# Patient Record
Sex: MALE | Race: WHITE | NOT HISPANIC OR LATINO
[De-identification: names, ages, dates, MRNs, and addresses within clinical notes are randomized per-mention and may not be internally consistent; named-entity substitution may affect disease eponyms.]

---

## 2022-06-01 PROBLEM — Z00.00 ENCOUNTER FOR PREVENTIVE HEALTH EXAMINATION: Status: ACTIVE | Noted: 2022-06-01

## 2022-06-02 ENCOUNTER — APPOINTMENT (OUTPATIENT)
Dept: PULMONOLOGY | Facility: CLINIC | Age: 67
End: 2022-06-02
Payer: MEDICARE

## 2022-06-02 VITALS
WEIGHT: 112 LBS | TEMPERATURE: 97.2 F | DIASTOLIC BLOOD PRESSURE: 60 MMHG | HEART RATE: 83 BPM | OXYGEN SATURATION: 97 % | SYSTOLIC BLOOD PRESSURE: 110 MMHG

## 2022-06-02 DIAGNOSIS — Z86.79 PERSONAL HISTORY OF OTHER DISEASES OF THE CIRCULATORY SYSTEM: ICD-10-CM

## 2022-06-02 DIAGNOSIS — Z86.39 PERSONAL HISTORY OF OTHER ENDOCRINE, NUTRITIONAL AND METABOLIC DISEASE: ICD-10-CM

## 2022-06-02 DIAGNOSIS — F17.201 NICOTINE DEPENDENCE, UNSPECIFIED, IN REMISSION: ICD-10-CM

## 2022-06-02 DIAGNOSIS — Z87.891 PERSONAL HISTORY OF NICOTINE DEPENDENCE: ICD-10-CM

## 2022-06-02 PROCEDURE — 99203 OFFICE O/P NEW LOW 30 MIN: CPT

## 2022-06-02 RX ORDER — PREDNISONE 10 MG/1
10 TABLET ORAL
Refills: 0 | Status: COMPLETED | COMMUNITY
Start: 2022-05-03 | End: 2022-06-02

## 2022-06-02 NOTE — DISCUSSION/SUMMARY
[FreeTextEntry1] : 67M smoker here to establish care for COPD management\par \par #COPD\par - No on home O2 after recent hospitalizatioin for pneumonia. Can likely slowly wean off\par - Will continue current regimen as it was maintaining him well prior to the pneumonia episode\par - Will need to obtain records from Dr. Martínez's office to look for prior PFT data\par \par #Tobacco use\par - Never had LDCT for lung ca screening. I have ordered it today and counseled Pt on importance of annual testing\par - Pt would benefit from pulmonary rehab but Pt declines as he cannot physically arrange transport to [facility] for the rehab program

## 2022-06-02 NOTE — REVIEW OF SYSTEMS
[Fatigue] : fatigue [Cough] : cough [Sputum] : sputum [Dyspnea] : dyspnea [SOB on Exertion] : sob on exertion [Claudication] : claudication [Fever] : no fever [Chills] : no chills [Poor Appetite] : no poor appetite [Nasal Congestion] : no nasal congestion [Sinus Problems] : no sinus problems [Hemoptysis] : no hemoptysis [Pleuritic Pain] : no pleuritic pain [Chest Discomfort] : no chest discomfort [Orthopnea] : no orthopnea [Syncope] : no syncope [GERD] : no gerd [Diarrhea] : no diarrhea [Food Intolerance] : no food intolerance [Nocturia] : no nocturia [Frequency] : no dysuria [Arthralgias] : no arthralgias [Myalgias] : no myalgias [Headache] : no headache [Dizziness] : no dizziness [Confusion] : no confusion [Depression] : no depression

## 2022-06-02 NOTE — PHYSICAL EXAM
[No Acute Distress] : no acute distress [No Deformities] : no deformities [Normal Appearance] : normal appearance [No Neck Mass] : no neck mass [Normal Rate/Rhythm] : normal rate/rhythm [Normal S1, S2] : normal s1, s2 [No Murmurs] : no murmurs [No Resp Distress] : no resp distress [No Abnormalities] : no abnormalities [Normal Gait] : normal gait [No Clubbing] : no clubbing [No Edema] : no edema [Normal Color/ Pigmentation] : normal color/ pigmentation [No Focal Deficits] : no focal deficits [Oriented x3] : oriented x3 [Normal Affect] : normal affect [TextBox_2] : cachetic [TextBox_44] : R & L scars from prior carotid surgeries [TextBox_68] : R side with mild egophony

## 2022-06-02 NOTE — HISTORY OF PRESENT ILLNESS
[Former] : former [>= 20 pack years] : >= 20 pack years [Never] : never [TextBox_4] : 67M with CAD, HTN, hyperlipidemia, PAD, COPD who presents to [facility] care. He was previously following with Dr. Martínez.\par \par Pt had pneumonia 1 month ago and was hospitalized at [facility] for 1 week. He was discharged on oxygen to be used during exertion. Pt frequently gets pneumonia in setting of his COPD but reports he had been stable for a long time on current regimen of daliresp, symbicort, and incruse. He does not use a rescue inhaler as he does not feel the need for it. In terms of his respiratory status, he does note daily productive cough since diagnosis of COPD. He has never been on home O2. He was a heavy smoker and amoked about 2.5ppd x50 yrs, quit 3 months ago. Denies weight changes, hemoptysis, pleurisy, syncope. He does feel fatigued ever since discharge home from the hospital.\par \par PMH: CAD s/p PCI; CEA, HTN, TIA, PAD\par PSH: CEA, hip replacements x3\par Meds: xarelto, plavix, atorvastatin, amlodipine, pantoprazole, coreg, daliresp, symbicort, incruse\par \par  [TextBox_11] : 2.5 [TextBox_13] : 50 [YearQuit] : 2022

## 2022-06-09 ENCOUNTER — APPOINTMENT (OUTPATIENT)
Dept: THORACIC SURGERY | Facility: CLINIC | Age: 67
End: 2022-06-09

## 2022-07-27 ENCOUNTER — APPOINTMENT (OUTPATIENT)
Dept: PULMONOLOGY | Facility: CLINIC | Age: 67
End: 2022-07-27

## 2023-06-15 RX ORDER — UMECLIDINIUM 62.5 UG/1
62.5 AEROSOL, POWDER ORAL DAILY
Qty: 1 | Refills: 5 | Status: ACTIVE | COMMUNITY
Start: 2022-06-02 | End: 1900-01-01

## 2023-08-14 DIAGNOSIS — J44.1 CHRONIC OBSTRUCTIVE PULMONARY DISEASE WITH (ACUTE) EXACERBATION: ICD-10-CM

## 2023-08-24 ENCOUNTER — APPOINTMENT (OUTPATIENT)
Dept: PULMONOLOGY | Facility: CLINIC | Age: 68
End: 2023-08-24

## 2023-09-20 ENCOUNTER — APPOINTMENT (OUTPATIENT)
Dept: PULMONOLOGY | Facility: CLINIC | Age: 68
End: 2023-09-20
Payer: MEDICARE

## 2023-09-20 VITALS
SYSTOLIC BLOOD PRESSURE: 132 MMHG | OXYGEN SATURATION: 97 % | HEART RATE: 63 BPM | BODY MASS INDEX: 18.68 KG/M2 | HEIGHT: 67 IN | WEIGHT: 119 LBS | DIASTOLIC BLOOD PRESSURE: 84 MMHG

## 2023-09-20 DIAGNOSIS — J44.9 CHRONIC OBSTRUCTIVE PULMONARY DISEASE, UNSPECIFIED: ICD-10-CM

## 2023-09-20 DIAGNOSIS — J18.9 PNEUMONIA, UNSPECIFIED ORGANISM: ICD-10-CM

## 2023-09-20 DIAGNOSIS — J90 PLEURAL EFFUSION, NOT ELSEWHERE CLASSIFIED: ICD-10-CM

## 2023-09-20 DIAGNOSIS — R06.02 SHORTNESS OF BREATH: ICD-10-CM

## 2023-09-20 PROCEDURE — 99213 OFFICE O/P EST LOW 20 MIN: CPT

## 2023-09-20 RX ORDER — VALSARTAN 80 MG/1
80 TABLET, COATED ORAL
Refills: 0 | Status: ACTIVE | COMMUNITY

## 2023-09-20 RX ORDER — CLOPIDOGREL 75 MG/1
75 TABLET, FILM COATED ORAL
Refills: 0 | Status: ACTIVE | COMMUNITY

## 2023-09-20 RX ORDER — RIVAROXABAN 15 MG/1
15 TABLET, FILM COATED ORAL
Refills: 0 | Status: ACTIVE | COMMUNITY

## 2023-09-20 RX ORDER — AMIODARONE HYDROCHLORIDE 200 MG/1
200 TABLET ORAL
Refills: 0 | Status: ACTIVE | COMMUNITY

## 2023-09-20 RX ORDER — CARVEDILOL 12.5 MG/1
12.5 TABLET, FILM COATED ORAL
Refills: 0 | Status: ACTIVE | COMMUNITY

## 2023-09-20 RX ORDER — ATORVASTATIN CALCIUM 40 MG/1
40 TABLET, FILM COATED ORAL
Refills: 0 | Status: ACTIVE | COMMUNITY

## 2023-09-20 RX ORDER — PREDNISONE 20 MG/1
20 TABLET ORAL
Qty: 14 | Refills: 0 | Status: DISCONTINUED | COMMUNITY
Start: 2023-08-21 | End: 2023-09-20

## 2023-09-20 RX ORDER — AMLODIPINE BESYLATE 10 MG/1
10 TABLET ORAL
Refills: 0 | Status: ACTIVE | COMMUNITY

## 2023-09-20 RX ORDER — AZITHROMYCIN 250 MG/1
250 TABLET, FILM COATED ORAL
Qty: 1 | Refills: 0 | Status: DISCONTINUED | COMMUNITY
Start: 2023-08-14 | End: 2023-09-20

## 2023-09-20 RX ORDER — PANTOPRAZOLE 40 MG/1
40 TABLET, DELAYED RELEASE ORAL
Refills: 0 | Status: ACTIVE | COMMUNITY

## 2023-10-18 ENCOUNTER — APPOINTMENT (OUTPATIENT)
Dept: PULMONOLOGY | Facility: CLINIC | Age: 68
End: 2023-10-18

## 2024-01-08 ENCOUNTER — RX RENEWAL (OUTPATIENT)
Age: 69
End: 2024-01-08

## 2024-02-02 ENCOUNTER — NON-APPOINTMENT (OUTPATIENT)
Age: 69
End: 2024-02-02

## 2024-02-14 ENCOUNTER — RX RENEWAL (OUTPATIENT)
Age: 69
End: 2024-02-14

## 2024-02-14 RX ORDER — ALBUTEROL SULFATE 90 UG/1
108 (90 BASE) INHALANT RESPIRATORY (INHALATION)
Qty: 1 | Refills: 5 | Status: ACTIVE | COMMUNITY
Start: 2023-07-25 | End: 1900-01-01

## 2024-02-16 ENCOUNTER — NON-APPOINTMENT (OUTPATIENT)
Age: 69
End: 2024-02-16

## 2024-02-21 PROBLEM — C34.90 ADENOCARCINOMA OF LUNG: Status: ACTIVE | Noted: 2024-02-21

## 2024-02-21 NOTE — HISTORY OF PRESENT ILLNESS
[de-identified] : Mr. Constantino Grey is 69 years old male with adenocarcinoma of lung here for consultation, referred by Dr. Chey Bella  Patient with hx of HTN, CAD, HLD, COPD, who was hospitalized at [facility] in Oct 2023 for shortness of breath and found to have RLL pneumonia and right pleural efffusion s/p 1L removal with cytology neg for malignancy.   Patient with   2/2024 Pleural fluid cytology at [facility] - adenocarcinoma likely lung origin  FHx:  SocialHx: former smoker -  Alcohol -  Illicit drugs  Screenings:  Vaccinations: Advanced Directive:

## 2024-02-21 NOTE — CONSULT LETTER
[Dear  ___] : Dear  [unfilled], [Consult Letter:] : I had the pleasure of evaluating your patient, [unfilled]. [Please see my note below.] : Please see my note below. [Consult Closing:] : Thank you very much for allowing me to participate in the care of this patient.  If you have any questions, please do not hesitate to contact me. [Sincerely,] : Sincerely, [FreeTextEntry3] : Augustus Henning MD, MPH  of Medicine [facility] Attending Physician  Hematology and Medical Oncology [facility]

## 2024-02-23 ENCOUNTER — APPOINTMENT (OUTPATIENT)
Dept: HEMATOLOGY ONCOLOGY | Facility: CLINIC | Age: 69
End: 2024-02-23

## 2024-02-23 DIAGNOSIS — C34.90 MALIGNANT NEOPLASM OF UNSPECIFIED PART OF UNSPECIFIED BRONCHUS OR LUNG: ICD-10-CM

## 2024-02-27 RX ORDER — BUDESONIDE AND FORMOTEROL FUMARATE DIHYDRATE 160; 4.5 UG/1; UG/1
160-4.5 AEROSOL RESPIRATORY (INHALATION) TWICE DAILY
Qty: 3 | Refills: 3 | Status: DISCONTINUED | COMMUNITY
Start: 2022-06-02 | End: 2024-02-27

## 2024-02-27 RX ORDER — MOMETASONE FUROATE AND FORMOTEROL FUMARATE DIHYDRATE 200; 5 UG/1; UG/1
200-5 AEROSOL RESPIRATORY (INHALATION)
Qty: 1 | Refills: 5 | Status: ACTIVE | COMMUNITY
Start: 2024-02-27 | End: 1900-01-01

## 2024-02-27 RX ORDER — BUDESONIDE AND FORMOTEROL FUMARATE DIHYDRATE 160; 4.5 UG/1; UG/1
160-4.5 AEROSOL RESPIRATORY (INHALATION) TWICE DAILY
Qty: 1 | Refills: 5 | Status: DISCONTINUED | COMMUNITY
Start: 2024-02-08 | End: 2024-02-27

## 2024-02-29 ENCOUNTER — APPOINTMENT (OUTPATIENT)
Dept: PULMONOLOGY | Facility: CLINIC | Age: 69
End: 2024-02-29

## 2024-03-01 ENCOUNTER — APPOINTMENT (OUTPATIENT)
Dept: PULMONOLOGY | Facility: CLINIC | Age: 69
End: 2024-03-01

## 2024-03-14 RX ORDER — REVEFENACIN 175 UG/3ML
175 SOLUTION RESPIRATORY (INHALATION)
Qty: 1 | Refills: 11 | Status: ACTIVE | COMMUNITY
Start: 2024-03-14 | End: 1900-01-01

## 2024-03-14 RX ORDER — ARFORMOTEROL TARTRATE INHALATION SOLUTION 15 UG/2ML
15 SOLUTION RESPIRATORY (INHALATION) TWICE DAILY
Qty: 1 | Refills: 5 | Status: ACTIVE | COMMUNITY
Start: 2024-03-14 | End: 1900-01-01

## 2024-03-14 RX ORDER — BUDESONIDE 0.5 MG/2ML
0.5 INHALANT ORAL TWICE DAILY
Qty: 2 | Refills: 5 | Status: ACTIVE | COMMUNITY
Start: 2024-03-14 | End: 1900-01-01

## 2024-03-14 RX ORDER — ALBUTEROL SULFATE 2.5 MG/3ML
(2.5 MG/3ML) SOLUTION RESPIRATORY (INHALATION)
Qty: 3 | Refills: 5 | Status: ACTIVE | COMMUNITY
Start: 2024-03-14 | End: 1900-01-01

## 2024-04-17 ENCOUNTER — APPOINTMENT (OUTPATIENT)
Dept: PULMONOLOGY | Facility: CLINIC | Age: 69
End: 2024-04-17